# Patient Record
Sex: MALE | Race: WHITE | NOT HISPANIC OR LATINO
[De-identification: names, ages, dates, MRNs, and addresses within clinical notes are randomized per-mention and may not be internally consistent; named-entity substitution may affect disease eponyms.]

---

## 2019-01-31 ENCOUNTER — APPOINTMENT (OUTPATIENT)
Dept: GASTROENTEROLOGY | Facility: CLINIC | Age: 39
End: 2019-01-31
Payer: COMMERCIAL

## 2019-01-31 VITALS
DIASTOLIC BLOOD PRESSURE: 83 MMHG | WEIGHT: 225 LBS | RESPIRATION RATE: 14 BRPM | BODY MASS INDEX: 31.5 KG/M2 | OXYGEN SATURATION: 98 % | HEIGHT: 71 IN | TEMPERATURE: 98.4 F | SYSTOLIC BLOOD PRESSURE: 118 MMHG | HEART RATE: 88 BPM

## 2019-01-31 DIAGNOSIS — Z87.19 PERSONAL HISTORY OF OTHER DISEASES OF THE DIGESTIVE SYSTEM: ICD-10-CM

## 2019-01-31 DIAGNOSIS — Z86.39 PERSONAL HISTORY OF OTHER ENDOCRINE, NUTRITIONAL AND METABOLIC DISEASE: ICD-10-CM

## 2019-01-31 DIAGNOSIS — Z87.898 PERSONAL HISTORY OF OTHER SPECIFIED CONDITIONS: ICD-10-CM

## 2019-01-31 DIAGNOSIS — F17.200 NICOTINE DEPENDENCE, UNSPECIFIED, UNCOMPLICATED: ICD-10-CM

## 2019-01-31 PROCEDURE — 99215 OFFICE O/P EST HI 40 MIN: CPT | Mod: GC

## 2019-01-31 RX ORDER — MESALAMINE 1000 MG/1
SUPPOSITORY RECTAL
Refills: 0 | Status: ACTIVE | COMMUNITY

## 2019-01-31 RX ORDER — MESALAMINE 1.2 G/1
TABLET, DELAYED RELEASE ORAL
Refills: 0 | Status: ACTIVE | COMMUNITY

## 2019-02-01 NOTE — PHYSICAL EXAM
[General Appearance - Alert] : alert [General Appearance - In No Acute Distress] : in no acute distress [Sclera] : the sclera and conjunctiva were normal [Oropharynx] : the oropharynx was normal [Neck Appearance] : the appearance of the neck was normal [Auscultation Breath Sounds / Voice Sounds] : lungs were clear to auscultation bilaterally [Heart Rate And Rhythm] : heart rate was normal and rhythm regular [Heart Sounds] : normal S1 and S2 [Heart Sounds Gallop] : no gallops [Murmurs] : no murmurs [Heart Sounds Pericardial Friction Rub] : no pericardial rub [Bowel Sounds] : normal bowel sounds [Abdomen Soft] : soft [Abdomen Tenderness] : non-tender [] : no hepato-splenomegaly [Abdomen Mass (___ Cm)] : no abdominal mass palpated [Abnormal Walk] : normal gait [Skin Color & Pigmentation] : normal skin color and pigmentation [FreeTextEntry1] : anxious

## 2019-02-01 NOTE — HISTORY OF PRESENT ILLNESS
[de-identified] : Pt is a 39yo M with PMH EOE, fatigue, UC dx  treated with oral and topical mesalamine\par \par Patient started seeing Dr. Rosendo Huntley after his former GI had changed practices, and was recommended by Dr. Smith to get a second opinion by Dr. Bui. \par \par He comes for second opinion concerned that there are other symptoms that may not be properly addressed; in other words that there is a an underlying cause of his various symptoms outside of UC. \par \par Firstly, his UC was dx in  first presenting with diarrhea with subsequent hematochezia. he was diagnosed with UC based on Bx and ANCA levels. He reports having general control of symptoms with lialda, but more so after adding canasa within the last 6 months. In general his bowel symptoms are much improved since a year, BS 4-5 1-3 times per day without blood. No abdominal pain no n/v.   He still has occasional urgency, though improved with canasa \par  \par Other than his UC, he reports multiple less tangible symptoms such as weakness, fatigue, brain fog and difficulty concentrating \par \par More specifically he also has been found to have EOE after EGD was done for food sticking, he was treated with swallowed flovent, however stopped after having pneumonia. \par He has not yet fully tried an exclusion diet. \par He also reports having eczema on the palms bilaterally even before UC dx\par \par Additionally he has some anxiety regarding mortality as his father passed away related to possibly liver disease (father was a drinker) and also twin brother  in infancy. \par He had liver test abnormalities while on crestor which normalized when stopping. US was done and showed fatty infiltration \par \par last colonoscopy  shows mild pancolitis with moderate rectosigmoiditis \par

## 2019-02-01 NOTE — ASSESSMENT
[FreeTextEntry1] : Pt is a 39yo M with EOE, fatigue, UC dx 2004 treated with oral and topical mesalamine.  He has extensive complaints, which are unclear if based on functional or undertreatment of UC.  He experiences anxeity and questionable adr with most medications.  I discussed with him in great detail today that our evaluation will either provide evidence of active inflammation (in which case we will address that as the priority) or not, and we will address functional complaints otherwise.\par \par \par #ulcerative colitis - patient has 15 years of UC pancolitis treated with oral and topical mesalamine with significant fatigue. While his clinical symptoms have improved with addition of canasa, his colonoscopies show persistent inflammation throughout the colon with lead piping. Discussed r/b/a/i of initiating biologic therapy vs continuing symptomatic treatment. Recommended reading on entyvio and [facility] website. \par -fecal calprotectin to confirm suspected inflammation\par -if fecal calprotectin low can consider desipramine for more symptomatic control and quelling of stress surrounding symptoms\par -if fecal calprotectin elevated, can consider entyvio as patient has anxiety regarding acquiring risk for malignancy \par -check HBV surface and core ab and quantiferon\par -consider at next visit an MRE to rule out small bowel disease\par - Order MRE at f/u to be sure no small bowel disease. \par \par #EOE  - persistent food sticking. initially treated with swallowed fludricortisone however stopped because of pneumonia. PPI was stopped due to perceived side effect (again discussed in detail that most headline grabbing studies have been proved false). Will try  food elimination diet because of limitations  with meds.\par -nutrition consult to help with 6 food elimination diet\par \par #fatty liver - hx of elevated LTs on statin, decreased after stopping. US shows hepatic steatosis\par -exercise and weight loss\par -check CBC\par -f/u MRI results when ordered\par \par RTC 2 months

## 2019-03-06 LAB — CALPROTECTIN FECAL: 157 UG/G

## 2019-03-13 ENCOUNTER — TRANSCRIPTION ENCOUNTER (OUTPATIENT)
Age: 39
End: 2019-03-13

## 2019-03-18 ENCOUNTER — APPOINTMENT (OUTPATIENT)
Dept: GASTROENTEROLOGY | Facility: CLINIC | Age: 39
End: 2019-03-18
Payer: COMMERCIAL

## 2019-03-18 VITALS
WEIGHT: 225 LBS | SYSTOLIC BLOOD PRESSURE: 121 MMHG | BODY MASS INDEX: 31.5 KG/M2 | OXYGEN SATURATION: 98 % | HEIGHT: 71 IN | DIASTOLIC BLOOD PRESSURE: 84 MMHG | HEART RATE: 96 BPM | RESPIRATION RATE: 16 BRPM

## 2019-03-18 DIAGNOSIS — K20.0 EOSINOPHILIC ESOPHAGITIS: ICD-10-CM

## 2019-03-18 DIAGNOSIS — K51.90 ULCERATIVE COLITIS, UNSPECIFIED, W/OUT COMPLICATIONS: ICD-10-CM

## 2019-03-18 PROCEDURE — 99214 OFFICE O/P EST MOD 30 MIN: CPT

## 2019-03-18 NOTE — ASSESSMENT
[FreeTextEntry1] : Pt is a 38 Y M with EOE, fatigue, UC dx 2004 treated with oral and topical mesalamine.  He has multiple questions regarding his UC and medication options.  He experiences anxiety and questionable ADRs with most medications.  I discussed with him in great detail today that our evaluation will either provide evidence of active inflammation (in which case we will address that as the priority) or not, and we will address functional complaints otherwise.\par \par #Ulcerative Colitis - patient has 15 years of UC pancolitis treated with oral and topical mesalamine with significant fatigue. His clinical symptoms have improved with addition of canasa. His colonoscopies show persistent inflammation throughout the colon with lead piping however none since 2017 or since improvement clinically. Discussed r/b/a/i of initiating biologic therapy vs continuing symptomatic treatment. Recommended reading on entyvio and [facility] website. \par - we went into detail regarding vedolizumab safety data, administration schedule, and risks/benefit of treatment; if escalation of therapy is necessary, entyvio is optimal treatment as patient has anxiety regarding acquiring risk for malignancy and needle phobia (excluding humira as an option)\par - recommended to perform cscope to evaluate level of disease as well as for colon cancer surveillance; this will allow us to better plan for treatment \par - can consider desipramine for more symptomatic control if disease is quiescent; quelling of stress surrounding symptoms\par - consider at next visit an MRE to rule out small bowel disease\par \par #EOE  - persistent food sticking. initially treated with swallowed fluticasone however stopped because of pneumonia. PPI was stopped due to perceived side effect (again discussed in detail that most headline grabbing studies have been proved false). Will try food elimination diet because of limitations with meds.\par - nutrition consult to help with 6 food elimination diet\par - schedule EGD to evaluate; r/b/a/i discussed in detail \par \par #fatty liver - hx of elevated LTs on statin, decreased after stopping. US shows hepatic steatosis\par - exercise and weight loss\par - CBC, LT's normal; cholesterol remains elevated - avoiding statin due to elevated LTs in the past \par - f/u MRI results when ordered; last US 2017 \par \par #anxiety\par - consider talk therapist\par - discussed coping mechanisms such as exercise\par - concerned it's causing him a decision paralysis.\par \par RTC post-procedure ; he's considering doing proc with Dr. Sean Huntley as he has established relationship.

## 2019-03-18 NOTE — CONSULT LETTER
[Dear  ___] : Dear  [unfilled], [Please see my note below.] : Please see my note below. [Consult Closing:] : Thank you very much for allowing me to participate in the care of this patient.  If you have any questions, please do not hesitate to contact me. [Sincerely,] : Sincerely, [Courtesy Letter:] : I had the pleasure of seeing your patient, [unfilled], in my office today. [FreeTextEntry3] : Beto Bui MD\par Director, IBD Program\par [facility]\par \par Associate Professor of Medicine\par [employer]\par [employer] [facility] [employer] [facility]\par

## 2019-03-18 NOTE — HISTORY OF PRESENT ILLNESS
[de-identified] : Pt is a 38 YM with PMH EOE, fatigue, UC dx  treated with oral and topical mesalamine, presents today feeling overall stable with many questions regarding his health trajectory and UC meds. \par \par Patient started seeing Dr. Rosendo Huntley after his former GI had changed practices, and was recommended by Dr. Smith to get a second opinion by Dr. Bui. \par \par He comes for second opinion concerned that there are other symptoms that may not be properly addressed regarding his EOE.  He still has difficulty swallowing on most days. Hasn't tried elimination diet. \par He reports his UC symptoms of urgency and bleeding have improved greatly the last 6 mo - 1 year on Canasa therapy. His fecal calpro was borderline elevated at 157. CRP normal, no anemia. \par \par HPI: Firstly, his UC was dx in  first presenting with diarrhea with subsequent hematochezia. he was diagnosed with UC based on Bx and ANCA levels. He reports having general control of symptoms with lialda, but more so after adding canasa within the last 6 months. In general his bowel symptoms are much improved since a year, BS 4-5 1-3 times per day without blood. No abdominal pain no n/v.  He still has occasional urgency, though improved with canasa \par  \par Other than his UC, he reports multiple less tangible symptoms such as weakness, fatigue, brain fog and difficulty concentrating. Light headedness during yoga and stress. Orthostatics normal at this visit. \par \par More specifically he also has been found to have EOE after EGD was done for food sticking, he was treated with swallowed flovent, however stopped after having pneumonia. \par He has not yet fully tried an exclusion diet. \par He also reports having eczema on the palms bilaterally even before UC dx\par \par Additionally he has some anxiety regarding mortality as his father passed away related to possibly liver disease (father was a drinker) and also twin brother  in infancy. \par He had liver test abnormalities while on crestor which normalized when stopping. US was done and showed fatty infiltration \par \par last colonoscopy  shows mild pancolitis with moderate rectosigmoiditis \par

## 2019-03-18 NOTE — PHYSICAL EXAM
[General Appearance - Alert] : alert [General Appearance - In No Acute Distress] : in no acute distress [Sclera] : the sclera and conjunctiva were normal [Oropharynx] : the oropharynx was normal [Neck Appearance] : the appearance of the neck was normal [Auscultation Breath Sounds / Voice Sounds] : lungs were clear to auscultation bilaterally [Heart Rate And Rhythm] : heart rate was normal and rhythm regular [Heart Sounds] : normal S1 and S2 [Heart Sounds Gallop] : no gallops [Murmurs] : no murmurs [Heart Sounds Pericardial Friction Rub] : no pericardial rub [Bowel Sounds] : normal bowel sounds [Abdomen Soft] : soft [Abdomen Tenderness] : non-tender [] : no hepato-splenomegaly [Abdomen Mass (___ Cm)] : no abdominal mass palpated [Abnormal Walk] : normal gait [Skin Color & Pigmentation] : normal skin color and pigmentation [FreeTextEntry1] : palmar eczema